# Patient Record
Sex: MALE | Race: BLACK OR AFRICAN AMERICAN
[De-identification: names, ages, dates, MRNs, and addresses within clinical notes are randomized per-mention and may not be internally consistent; named-entity substitution may affect disease eponyms.]

---

## 2018-02-26 ENCOUNTER — HOSPITAL ENCOUNTER (EMERGENCY)
Dept: HOSPITAL 47 - EC | Age: 18
Discharge: HOME | End: 2018-02-26
Payer: COMMERCIAL

## 2018-02-26 VITALS
RESPIRATION RATE: 17 BRPM | HEART RATE: 80 BPM | TEMPERATURE: 97.3 F | DIASTOLIC BLOOD PRESSURE: 59 MMHG | SYSTOLIC BLOOD PRESSURE: 118 MMHG

## 2018-02-26 DIAGNOSIS — R55: Primary | ICD-10-CM

## 2018-02-26 PROCEDURE — 99282 EMERGENCY DEPT VISIT SF MDM: CPT

## 2018-02-26 NOTE — ED
General Adult HPI





- General


Chief complaint: Medical Clearance


Stated complaint: syncope


Time Seen by Provider: 02/26/18 10:45


Source: patient, RN notes reviewed


Mode of arrival: wheelchair


Limitations: no limitations





- History of Present Illness


Initial comments: 





This is a 17-year-old who presents to the emergency department asking for a 

work note to go back to work.  Patient states he fainted at work 2 weeks ago 

and 2 days after that followed up at a hospital in [place].  Patient states 

they discharged her and recommended that he follow up with a cardiologist.  

Patient is not followed up with cardiologist when he wants to go back to work 

so is coming in today for a work note.  Patient states he hasn't had any 

symptoms since he passed out 2 weeks ago patient denies any chest pain 

palpitations difficulty breathing shortness of breath.  Patient denies any 

recent fever chills or cough.  Patient denies abdominal pain patient denies 

nausea vomiting diarrhea.  Patient denies any other episodes of syncope near 

syncope or dizziness.  Patient denies any abdominal pain.  Patient denies any 

headache patient denies any numbness or weakness.  Patient is strictly here for 

a work note when I informed him he needed to follow up with cardiology he didn'

t want any workup in our emergency department he wanted to follow-up as an 

outpatient.





- Related Data


 Home Medications











 Medication  Instructions  Recorded  Confirmed


 


Acetaminophen [Tylenol] 1,300 mg PO BID PRN 02/26/18 02/26/18


 


Ibuprofen [Motrin Ib] 400 mg PO Q6HR PRN 02/26/18 02/26/18











 Allergies











Allergy/AdvReac Type Severity Reaction Status Date / Time


 


No Known Allergies Allergy   Verified 02/26/18 10:54














Review of Systems


ROS Statement: 


Those systems with pertinent positive or pertinent negative responses have been 

documented in the HPI.





ROS Other: All systems not noted in ROS Statement are negative.





Past Medical History


Past Medical History: No Reported History


History of Any Multi-Drug Resistant Organisms: None Reported


Past Surgical History: No Surgical Hx Reported


Past Psychological History: No Psychological Hx Reported


Smoking Status: Never smoker


Past Alcohol Use History: None Reported


Past Drug Use History: Marijuana





General Exam





- General Exam Comments


Initial Comments: 





GENERAL:


Patient is well-developed and well-nourished.  Patient is nontoxic and well-

hydrated and is in no acute distress.





ENT:


Neck is soft and supple.  No significant lymphadenopathy is noted.  Oropharynx 

is clear.  Moist mucous membranes.  





EYES:


The sclera were anicteric and conjunctiva were pink and moist.  Extraocular 

movements were intact and pupils were equal round and reactive to light.  

Eyelids were unremarkable.





SKIN:


Skin is clear with no lesions or rashes and otherwise unremarkable.





NEUROLOGIC:


Patient is alert and oriented x3.  Cranial nerves II through XII are grossly 

intact.  Motor and sensory are also intact.  Normal speech, volume and content.

  Symmetrical smile.   





MUSCULOSKELETAL:


Normal extremities with adequate strength and full range of motion. 











PSYCHIATRIC:


Normal psychiatric evaluation.  N


Limitations: no limitations





Course





 Vital Signs











  02/26/18





  10:42


 


Temperature 97.3 F L


 


Pulse Rate 80


 


Respiratory 17





Rate 


 


Blood Pressure 118/59


 


O2 Sat by Pulse 100





Oximetry 














Disposition


Clinical Impression: 


 History of syncope





Disposition: HOME SELF-CARE


Additional Instructions: 


Patient is to follow-up with a cardiologist.


Referrals: 


None,Stated [Primary Care Provider] - 1-2 days


Time of Disposition: 11:37

## 2018-03-28 ENCOUNTER — HOSPITAL ENCOUNTER (EMERGENCY)
Dept: HOSPITAL 47 - EC | Age: 18
Discharge: HOME | End: 2018-03-28
Payer: COMMERCIAL

## 2018-03-28 VITALS
TEMPERATURE: 97.7 F | RESPIRATION RATE: 20 BRPM | SYSTOLIC BLOOD PRESSURE: 126 MMHG | DIASTOLIC BLOOD PRESSURE: 78 MMHG | HEART RATE: 72 BPM

## 2018-03-28 DIAGNOSIS — R05: ICD-10-CM

## 2018-03-28 DIAGNOSIS — J02.9: Primary | ICD-10-CM

## 2018-03-28 PROCEDURE — 99282 EMERGENCY DEPT VISIT SF MDM: CPT

## 2018-03-28 NOTE — ED
General Adult HPI





- General


Chief complaint: Upper Respiratory Infection


Stated complaint: work clearance


Time Seen by Provider: 03/28/18 12:39


Source: patient, RN notes reviewed


Mode of arrival: ambulatory





- History of Present Illness


Initial comments: 





17-year-old male presents to the emergency department for work clearance.  

Patient states he was sick for about 3 days 5 days ago.  Patient states he has 

felt back to normal past 2 days.  Patient denies any fever, nausea, or vomiting 

when he was sick..  He had a cough and a sore throat.  Patient states his 

symptoms have completely resolved.  Patient states he does not want/need a 

workup at this time but just needs clearance to go back to work.  Patient 

denies headaches, congestion, sore throat, cough, shortness of breath, chest 

pain, or abdominal pain at this time.  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 

his boss wanted a note of clearance before he could come back.





- Related Data


 Home Medications











 Medication  Instructions  Recorded  Confirmed


 


No Known Home Medications [No  03/28/18 03/28/18





Known Home Medications]   











 Allergies











Allergy/AdvReac Type Severity Reaction Status Date / Time


 


No Known Allergies Allergy   Verified 03/28/18 12:31














Review of Systems


ROS Statement: 


Those systems with pertinent positive or pertinent negative responses have been 

documented in the HPI.





ROS Other: All systems not noted in ROS Statement are negative.





Past Medical History


Past Medical History: No Reported History


History of Any Multi-Drug Resistant Organisms: None Reported


Past Surgical History: No Surgical Hx Reported


Past Psychological History: No Psychological Hx Reported


Smoking Status: Never smoker


Past Alcohol Use History: None Reported


Past Drug Use History: Marijuana





General Exam


Eye exam: Present: normal appearance, PERRL, EOMI.  Absent: scleral icterus, 

conjunctival injection, periorbital swelling


ENT exam: Present: normal exam, normal oropharynx, mucous membranes moist, TM's 

normal bilaterally


Neck exam: Present: normal inspection.  Absent: tenderness, meningismus, 

lymphadenopathy


Respiratory exam: Present: normal lung sounds bilaterally.  Absent: respiratory 

distress, wheezes, rales, rhonchi, stridor


Cardiovascular Exam: Present: regular rate, normal rhythm, normal heart sounds.

  Absent: systolic murmur, diastolic murmur, rubs, gallop, clicks


GI/Abdominal exam: Present: soft, normal bowel sounds.  Absent: distended, 

tenderness, guarding, rebound, rigid





Course


 Vital Signs











  03/28/18





  12:12


 


Temperature 97.7 F


 


Pulse Rate 72


 


Respiratory 20





Rate 


 


Blood Pressure 126/78


 


O2 Sat by Pulse 99





Oximetry 














Medical Decision Making





- Medical Decision Making





17-year-old male with no complaints today presents the emergency department for 

work clearance.  Patient states he was sick with a cough and sore throat 5 days 

ago.  He has felt much better the past 2 days and feels back to normal.  

Patient does not want a workup at this time as he feels completely better.  

Patient's vitals are all within normal limits: Temperature 97.7, pulse 72, 

respirations 20, blood pressure 126/78, pulse ox 99% on room air. Patient 

simply needs clearance to go back to work as required by his job.  Patient 

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  Patient will be written a note clearing him 

to go back to work today.





Disposition


Clinical Impression: 


 Normal exam





Disposition: HOME SELF-CARE


Condition: Good


Additional Instructions: 


Please return to the emergency department if your symptoms return.  Please 

follow up with primary care provider in one to 2 days.


Referrals: 


None,Stated [Primary Care Provider] - 1-2 days


Time of Disposition: 12:53

## 2018-07-23 ENCOUNTER — HOSPITAL ENCOUNTER (EMERGENCY)
Dept: HOSPITAL 47 - EC | Age: 18
Discharge: HOME | End: 2018-07-23
Payer: COMMERCIAL

## 2018-07-23 VITALS
HEART RATE: 103 BPM | SYSTOLIC BLOOD PRESSURE: 112 MMHG | DIASTOLIC BLOOD PRESSURE: 77 MMHG | TEMPERATURE: 98.6 F | RESPIRATION RATE: 18 BRPM
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
## 2018-07-23 DIAGNOSIS — S60.211A: Primary | ICD-10-CM

## 2018-07-23 DIAGNOSIS — F17.200: ICD-10-CM

## 2018-07-23 DIAGNOSIS — Z23: ICD-10-CM

## 2018-07-23 DIAGNOSIS — S60.511A: ICD-10-CM

## 2018-07-23 PROCEDURE — 90715 TDAP VACCINE 7 YRS/> IM: CPT

## 2018-07-23 PROCEDURE — 90471 IMMUNIZATION ADMIN: CPT

## 2018-07-23 PROCEDURE — 99283 EMERGENCY DEPT VISIT LOW MDM: CPT
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
## 2018-07-23 NOTE — XR
EXAMINATION TYPE: XR wrist complete RT

 

DATE OF EXAM: 7/23/2018

 

COMPARISON: NONE

 

HISTORY: Wrist pain

 

TECHNIQUE: 4 views

 

FINDINGS: I see no fracture nor dislocation. Joint spaces are normal. Carpal bones are intact.

 

IMPRESSION: Normal right wrist.

## 2018-12-04 ENCOUNTER — HOSPITAL ENCOUNTER (EMERGENCY)
Dept: HOSPITAL 47 - EC | Age: 18
Discharge: HOME | End: 2018-12-04
Payer: COMMERCIAL

## 2018-12-04 VITALS
TEMPERATURE: 98.2 F | SYSTOLIC BLOOD PRESSURE: 125 MMHG | DIASTOLIC BLOOD PRESSURE: 80 MMHG | RESPIRATION RATE: 18 BRPM | HEART RATE: 82 BPM
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
## 2018-12-04 DIAGNOSIS — F17.200: ICD-10-CM

## 2018-12-04 DIAGNOSIS — S63.502A: Primary | ICD-10-CM

## 2018-12-04 PROCEDURE — 80306 DRUG TEST PRSMV INSTRMNT: CPT

## 2018-12-04 PROCEDURE — 99284 EMERGENCY DEPT VISIT MOD MDM: CPT

## 2018-12-04 NOTE — XR
EXAMINATION TYPE: XR wrist complete LT

 

DATE OF EXAM: 12/4/2018

 

CLINICAL HISTORY: pain

 

TECHNIQUE:  Frontal, lateral and oblique images of the left wrist are obtained.

 

COMPARISON: None.

 

FINDINGS:  There is no acute fracture/dislocation evident. The joint spaces  appear within normal banks
its.  The overlying soft tissue appears unremarkable.

 

IMPRESSION:  

 

There is no acute fracture or dislocation seen.

 

ICD 10 NO FRACTURE, INITIAL EVALUATION
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
## 2019-02-17 ENCOUNTER — HOSPITAL ENCOUNTER (EMERGENCY)
Dept: HOSPITAL 47 - EC | Age: 19
Discharge: HOME | End: 2019-02-17
Payer: COMMERCIAL

## 2019-02-17 VITALS — RESPIRATION RATE: 18 BRPM

## 2019-02-17 VITALS — TEMPERATURE: 98.3 F | DIASTOLIC BLOOD PRESSURE: 73 MMHG | SYSTOLIC BLOOD PRESSURE: 121 MMHG | HEART RATE: 83 BPM

## 2019-02-17 DIAGNOSIS — N49.2: Primary | ICD-10-CM

## 2019-02-17 DIAGNOSIS — F17.200: ICD-10-CM

## 2019-02-17 PROCEDURE — 99283 EMERGENCY DEPT VISIT LOW MDM: CPT

## 2019-02-17 NOTE — ED
Skin/Abscess/FB HPI





- General


Chief complaint: Skin/Abscess/Foreign Body


Stated complaint: cyst on scrotum


Time Seen by Provider: 02/17/19 18:40


Source: patient, EMS


Mode of arrival: EMS


Limitations: no limitations





- History of Present Illness


Initial comments: 


18-year-old male patient presents to the emergency department today for 

evaluation of painful, draining, lump to the right scrotal region.  Patient 

states that the area has been increasing in size since Wednesday.  Patient 

states that today developed a "pimple" on the top that did rupture and he 

started having purulent bloody drainage from the area.  Patient states the area 

is painful.  Denies any fevers or chills with this.  Denies any difficulty with 

urination.  Denies any nausea or vomiting. Patient denies any recent rash, 

shortness breath, chest pain, abdominal pain, nausea, vomiting, diarrhea, 

constipation, back pain, numbness, tingling, dizziness, weakness, hematuria, 

dysuria, urinary urgency, urinary frequency, headache, visual changes, or any 

other complaints.








- Related Data


 Previous Rx's











 Medication  Instructions  Recorded


 


Ibuprofen [Motrin] 600 mg PO Q8HR PRN #30 tab 02/17/19


 


Sulfamethoxazole/Trimethoprim 1 each PO BID #20 tablet 02/17/19





[Bactrim -160 mg]  











 Allergies











Allergy/AdvReac Type Severity Reaction Status Date / Time


 


No Known Allergies Allergy   Verified 02/17/19 19:02














Review of Systems


ROS Statement: 


Those systems with pertinent positive or pertinent negative responses have been 

documented in the HPI.





ROS Other: All systems not noted in ROS Statement are negative.





Past Medical History


Past Medical History: No Reported History


History of Any Multi-Drug Resistant Organisms: None Reported


Past Surgical History: No Surgical Hx Reported


Past Psychological History: No Psychological Hx Reported


Smoking Status: Current every day smoker


Past Alcohol Use History: None Reported


Past Drug Use History: Marijuana





General Exam


Limitations: no limitations


General appearance: alert, in no apparent distress, other (This is a well-

developed, well-nourished adult male patient in no acute distress.  Vital signs 

upon presentation shows temperature 97.9F, pulse 88, respirations 18, blood 

pressure 127/73, pulse ox 98% on room air.)


Eye exam: Present: normal appearance, PERRL, EOMI.  Absent: scleral icterus, 

conjunctival injection, periorbital swelling


ENT exam: Present: normal exam, normal oropharynx, mucous membranes moist


Respiratory exam: Present: normal lung sounds bilaterally.  Absent: respiratory 

distress, wheezes, rales, rhonchi, stridor


Cardiovascular Exam: Present: regular rate, normal rhythm, normal heart sounds.

  Absent: systolic murmur, diastolic murmur, rubs, gallop, clicks


GI/Abdominal exam: Present: soft, normal bowel sounds.  Absent: distended, 

tenderness, guarding, rebound, rigid


 exam: Present: testicular tenderness (Right), other (Patient has 1 cm x 2 cm 

area of swelling to the right scrotum, currently draining purulent bloody 

drainage.  Consistent with abscess.).  Absent: scrotal swelling


Neurological exam: Present: alert, oriented X3, CN II-XII intact


Psychiatric exam: Present: normal affect, normal mood


Skin exam: Present: warm, dry, intact, normal color.  Absent: rash





Course


 Vital Signs











  02/17/19 02/17/19





  18:41 19:37


 


Temperature 97.9 F 98.3 F


 


Pulse Rate 88 83


 


Respiratory 18 18





Rate  


 


Blood Pressure 127/73 121/73


 


O2 Sat by Pulse 98 98





Oximetry  














Medical Decision Making





- Medical Decision Making


18-year-old male patient presented to the emergency department today for 

evaluation of swelling and drainage from a lesion to the right scrotum.  

Physical examination did reveal a 1 cm x 2 cm area of abscess to the right 

scrotum.  This was currently draining purulent bloody drainage.  Given that it 

is already draining is felt that incision is not needed at this time.  We will 

start Bactrim.  Patient is educated regarding warm compresses and warm sits 

baths.  He is instructed to follow-up with urologist for further evaluation.  

Return parameters were discussed in detail.  He verbalizes understanding and 

agrees with this plan.








Disposition


Clinical Impression: 


 Scrotal abscess





Disposition: HOME SELF-CARE


Condition: Good


Instructions (If sedation given, give patient instructions):  Abscess (ED)


Additional Instructions: 


Do warm compresses or warm baths 2-3 times daily.  Take medications as 

directed.  Follow-up with the urologist for further evaluation.  Return to the 

emergency department for any new, worsening, or concerning symptoms.


Prescriptions: 


Ibuprofen [Motrin] 600 mg PO Q8HR PRN #30 tab


 PRN Reason: Pain


Sulfamethoxazole/Trimethoprim [Bactrim -160 mg] 1 each PO BID #20 tablet


Is patient prescribed a controlled substance at d/c from ED?: No


Referrals: 


Owen Archibald MD [STAFF PHYSICIAN] - 1-2 days


None,Stated [Primary Care Provider] - 1-2 days


Time of Disposition: 19:25